# Patient Record
Sex: MALE | Race: WHITE | Employment: PART TIME | URBAN - METROPOLITAN AREA
[De-identification: names, ages, dates, MRNs, and addresses within clinical notes are randomized per-mention and may not be internally consistent; named-entity substitution may affect disease eponyms.]

---

## 2017-05-27 NOTE — ED PROVIDER NOTES
Patient Seen in: [facility] Emergency Department    History   Patient presents with:  Eval-P (psychiatric)    Stated Complaint: ETOH detox    HPI    70-year-old male presents emergency room requesting alcohol detox.   Patient states that he drinks al 4/11/2012    Comment Procedure: CERVICAL EPIDURAL;  Surgeon: Hannah Resendiz MD;  Location: [address] & [facility] NDL/CATH SPI DX/THER Sylwia Kimble  4/11/2012    Comment Procedure: LUMBAR EPIDURAL;  Surgeon: Hannah Resendiz MD;  [facility] Status: Current Every Day Smoker        Packs/Day: 1.50  Years: 30      Smokeless Status: Never Used                        Alcohol Use: Yes           7.0 oz/week       14 Glasses of wine per week      Review of Systems    Positive for stated complaint: ET - Abnormal; Notable for the following:     Glucose 131 (*)     All other components within normal limits   ETHYL ALCOHOL - Abnormal; Notable for the following:     Ethyl Alcohol 4 (*)     All other components within normal limits   DRUG SCREEN 7 W/OUT CONF discharge.       Disposition and Plan     Clinical Impression:  Alcohol withdrawal, with unspecified complication ([facility] [address]  (primary encounter diagnosis)  Essential hypertension    Disposition:  Discharge    Follow-up:  Michel Ragland MD  [address] [facility] [place]

## 2017-05-28 NOTE — ED NOTES
Received bedside report from Ying Tejada, [facility]. Pt finishing dinner, denies c/o at this time.  NAD

## 2019-02-11 ENCOUNTER — TELEPHONE (OUTPATIENT)
Dept: INTERNAL MEDICINE CLINIC | Facility: CLINIC | Age: 49
End: 2019-02-11

## 2019-02-11 NOTE — TELEPHONE ENCOUNTER
Patient called requesting to establish care with Dr. Latasha Oates. Patient's fiance is a current patient - Claressa Lanes  Please advise if ok to schedule?

## 2019-02-12 ENCOUNTER — OFFICE VISIT (OUTPATIENT)
Dept: INTERNAL MEDICINE CLINIC | Facility: CLINIC | Age: 49
End: 2019-02-12
Payer: COMMERCIAL

## 2019-02-12 VITALS
SYSTOLIC BLOOD PRESSURE: 126 MMHG | RESPIRATION RATE: 16 BRPM | DIASTOLIC BLOOD PRESSURE: 82 MMHG | WEIGHT: 247 LBS | TEMPERATURE: 98 F | BODY MASS INDEX: 34.97 KG/M2 | HEART RATE: 74 BPM | HEIGHT: 70.5 IN

## 2019-02-12 DIAGNOSIS — J01.40 ACUTE NON-RECURRENT PANSINUSITIS: Primary | ICD-10-CM

## 2019-02-12 PROBLEM — F10.231 ALCOHOL WITHDRAWAL SYNDROME, WITH DELIRIUM (HCC): Status: ACTIVE | Noted: 2017-09-15

## 2019-02-12 PROBLEM — N28.9 RENAL INSUFFICIENCY SYNDROME: Status: ACTIVE | Noted: 2017-09-15

## 2019-02-12 PROBLEM — F10.231 ALCOHOL WITHDRAWAL SYNDROME, WITH DELIRIUM (HCC): Status: RESOLVED | Noted: 2017-09-15 | Resolved: 2019-02-12

## 2019-02-12 PROBLEM — F10.21 ALCOHOLISM IN REMISSION (HCC): Status: ACTIVE | Noted: 2019-02-12

## 2019-02-12 PROBLEM — E83.42 HYPOMAGNESEMIA: Status: ACTIVE | Noted: 2017-09-15

## 2019-02-12 PROCEDURE — 99202 OFFICE O/P NEW SF 15 MIN: CPT | Performed by: NURSE PRACTITIONER

## 2019-02-12 PROCEDURE — 99406 BEHAV CHNG SMOKING 3-10 MIN: CPT | Performed by: NURSE PRACTITIONER

## 2019-02-12 RX ORDER — AZITHROMYCIN 250 MG/1
TABLET, FILM COATED ORAL
Qty: 6 TABLET | Refills: 0 | Status: CANCELLED | OUTPATIENT
Start: 2019-02-12

## 2019-02-12 RX ORDER — CEFDINIR 300 MG/1
300 CAPSULE ORAL 2 TIMES DAILY
Qty: 20 CAPSULE | Refills: 0 | Status: SHIPPED | OUTPATIENT
Start: 2019-02-12 | End: 2019-02-22

## 2019-02-12 RX ORDER — CARBINOXAMINE MALEATE 6 MG/1
6 TABLET ORAL 4 TIMES DAILY PRN
Qty: 120 TABLET | Refills: 0 | Status: SHIPPED | OUTPATIENT
Start: 2019-02-12 | End: 2019-03-14

## 2019-02-12 NOTE — PROGRESS NOTES
CHIEF COMPLAINT:   Patient presents with:  Sinusitis: Congested, not energy for 2 weeks  Headache  Body ache and/or chills      HPI:   Khadijah Jiang is a 50year old male who presents for upper respiratory symptoms for  2 weeks.  Patient reports sore throa per nostril once daily Disp:  Rfl:    folic acid 1 MG Oral Tab Take by mouth daily. Disp:  Rfl:    Thiamine HCl 100 MG Oral Tab Take 100 mg by mouth daily.  Disp:  Rfl:    Amphetamine-Dextroamphet ER (ADDERALL XR) 30 MG Oral Capsule SR 24 Hr Take 30 mg by m on palpation of maxillary and frontal sinuses  EYES: conjunctiva clear, EOM intact  EARS: TM's opaque, no bulging, retraction.  bony landmarks visible  NOSE: Nostrils patent, no nasal discharge, nasal mucosa erythema  THROAT: Oral mucosa pink, moist. Poster

## (undated) NOTE — LETTER
Date: 2/12/2019    Patient Name: Jennifer Mcghee          To Whom it may concern: This letter has been written at the patient's request. The above patient was seen at the [facility] for treatment of a medical condition.     This patient shou

## (undated) NOTE — ED AVS SNAPSHOT
[facility] Emergency Department    [place]  [address] [address]    Phone:  [phone]    Fax:  [phone] [facility]   MRN: [id number]    Department:  [facility] Emergency Department   Date of Visit:  5/27/ IF THERE IS ANY CHANGE OR WORSENING OF YOUR CONDITION, CALL YOUR PRIMARY CARE PHYSICIAN AT ONCE OR RETURN IMMEDIATELY TO THE EMERGENCY DEPARTMENT.     If you have been prescribed any medication(s), please fill your prescription right away and begin taking t

## (undated) NOTE — ED AVS SNAPSHOT
[facility] Emergency Department    [place]  [address] [address]    Phone:  [phone]    [facility]ax:  [phone] [facility]   MRN: [id number]    Department:  [facility] Emergency Department   Date of Visit:  5/27/ (withdrawal symptom). CHANGE how you take these medications     lisinopril 10 MG Tabs   Quantity:  7 tablet   Commonly known as:  PRINIVIL,ZESTRIL   Take 1 tablet (10 mg total) by mouth daily. What changed:   This medication is already on your med visit does not uncover every injury or illness.  If you have been referred to a primary care or a specialist physician for a follow-up visit, please tell this physician (or your personal doctor if your instructions are to return to your personal doctor) abo Sharyn Sloan [phone] [facility] ([address]) [phone]                Additional Information       We are concerned for your overall well being:    - If you are a smoker or have smoked in the last 12 months, we encourage you to explore options for [facility]